# Patient Record
Sex: MALE | Race: WHITE | Employment: UNEMPLOYED | URBAN - METROPOLITAN AREA
[De-identification: names, ages, dates, MRNs, and addresses within clinical notes are randomized per-mention and may not be internally consistent; named-entity substitution may affect disease eponyms.]

---

## 2021-01-10 ENCOUNTER — APPOINTMENT (OUTPATIENT)
Dept: ULTRASOUND IMAGING | Age: 31
End: 2021-01-10

## 2021-01-10 ENCOUNTER — HOSPITAL ENCOUNTER (EMERGENCY)
Age: 31
Discharge: HOME | End: 2021-01-10

## 2021-01-10 VITALS
SYSTOLIC BLOOD PRESSURE: 123 MMHG | WEIGHT: 210 LBS | HEART RATE: 66 BPM | TEMPERATURE: 98.8 F | BODY MASS INDEX: 31.1 KG/M2 | HEIGHT: 69 IN | RESPIRATION RATE: 16 BRPM | OXYGEN SATURATION: 97 % | DIASTOLIC BLOOD PRESSURE: 77 MMHG

## 2021-01-10 DIAGNOSIS — R10.9 ABDOMINAL PAIN, UNSPECIFIED ABDOMINAL LOCATION: Primary | ICD-10-CM

## 2021-01-10 DIAGNOSIS — R11.2 NON-INTRACTABLE VOMITING WITH NAUSEA, UNSPECIFIED VOMITING TYPE: ICD-10-CM

## 2021-01-10 LAB
ALBUMIN SERPL-MCNC: 3.9 GM/DL (ref 3.4–5)
ALP BLD-CCNC: 79 IU/L (ref 40–129)
ALT SERPL-CCNC: 15 U/L (ref 10–40)
ANION GAP SERPL CALCULATED.3IONS-SCNC: 9 MMOL/L (ref 4–16)
AST SERPL-CCNC: 17 IU/L (ref 15–37)
BASOPHILS ABSOLUTE: 0.1 K/CU MM
BASOPHILS RELATIVE PERCENT: 0.7 % (ref 0–1)
BILIRUB SERPL-MCNC: 0.2 MG/DL (ref 0–1)
BUN BLDV-MCNC: 13 MG/DL (ref 6–23)
CALCIUM SERPL-MCNC: 9 MG/DL (ref 8.3–10.6)
CHLORIDE BLD-SCNC: 99 MMOL/L (ref 99–110)
CO2: 25 MMOL/L (ref 21–32)
CREAT SERPL-MCNC: 0.8 MG/DL (ref 0.9–1.3)
DIFFERENTIAL TYPE: ABNORMAL
EOSINOPHILS ABSOLUTE: 0.3 K/CU MM
EOSINOPHILS RELATIVE PERCENT: 3.1 % (ref 0–3)
GFR AFRICAN AMERICAN: >60 ML/MIN/1.73M2
GFR NON-AFRICAN AMERICAN: >60 ML/MIN/1.73M2
GLUCOSE BLD-MCNC: 87 MG/DL (ref 70–99)
HCT VFR BLD CALC: 44.2 % (ref 42–52)
HEMOGLOBIN: 14.7 GM/DL (ref 13.5–18)
IMMATURE NEUTROPHIL %: 0.2 % (ref 0–0.43)
LIPASE: 25 IU/L (ref 13–60)
LYMPHOCYTES ABSOLUTE: 4.3 K/CU MM
LYMPHOCYTES RELATIVE PERCENT: 43.4 % (ref 24–44)
MCH RBC QN AUTO: 29 PG (ref 27–31)
MCHC RBC AUTO-ENTMCNC: 33.3 % (ref 32–36)
MCV RBC AUTO: 87.2 FL (ref 78–100)
MONOCYTES ABSOLUTE: 0.9 K/CU MM
MONOCYTES RELATIVE PERCENT: 9 % (ref 0–4)
NUCLEATED RBC %: 0 %
PDW BLD-RTO: 13.6 % (ref 11.7–14.9)
PLATELET # BLD: 305 K/CU MM (ref 140–440)
PMV BLD AUTO: 9.2 FL (ref 7.5–11.1)
POTASSIUM SERPL-SCNC: 4 MMOL/L (ref 3.5–5.1)
RBC # BLD: 5.07 M/CU MM (ref 4.6–6.2)
SEGMENTED NEUTROPHILS ABSOLUTE COUNT: 4.4 K/CU MM
SEGMENTED NEUTROPHILS RELATIVE PERCENT: 43.6 % (ref 36–66)
SODIUM BLD-SCNC: 133 MMOL/L (ref 135–145)
TOTAL IMMATURE NEUTOROPHIL: 0.02 K/CU MM
TOTAL NUCLEATED RBC: 0 K/CU MM
TOTAL PROTEIN: 6.9 GM/DL (ref 6.4–8.2)
WBC # BLD: 10 K/CU MM (ref 4–10.5)

## 2021-01-10 PROCEDURE — 2580000003 HC RX 258: Performed by: PHYSICIAN ASSISTANT

## 2021-01-10 PROCEDURE — 85025 COMPLETE CBC W/AUTO DIFF WBC: CPT

## 2021-01-10 PROCEDURE — 83690 ASSAY OF LIPASE: CPT

## 2021-01-10 PROCEDURE — 80053 COMPREHEN METABOLIC PANEL: CPT

## 2021-01-10 PROCEDURE — 96361 HYDRATE IV INFUSION ADD-ON: CPT

## 2021-01-10 PROCEDURE — 96374 THER/PROPH/DIAG INJ IV PUSH: CPT

## 2021-01-10 PROCEDURE — 96375 TX/PRO/DX INJ NEW DRUG ADDON: CPT

## 2021-01-10 PROCEDURE — 76705 ECHO EXAM OF ABDOMEN: CPT

## 2021-01-10 PROCEDURE — 6360000002 HC RX W HCPCS: Performed by: PHYSICIAN ASSISTANT

## 2021-01-10 PROCEDURE — 99285 EMERGENCY DEPT VISIT HI MDM: CPT

## 2021-01-10 RX ORDER — KETOROLAC TROMETHAMINE 30 MG/ML
30 INJECTION, SOLUTION INTRAMUSCULAR; INTRAVENOUS ONCE
Status: COMPLETED | OUTPATIENT
Start: 2021-01-10 | End: 2021-01-10

## 2021-01-10 RX ORDER — 0.9 % SODIUM CHLORIDE 0.9 %
1000 INTRAVENOUS SOLUTION INTRAVENOUS ONCE
Status: COMPLETED | OUTPATIENT
Start: 2021-01-10 | End: 2021-01-10

## 2021-01-10 RX ORDER — ONDANSETRON 4 MG/1
4 TABLET, ORALLY DISINTEGRATING ORAL EVERY 6 HOURS
Qty: 10 TABLET | Refills: 0 | Status: SHIPPED | OUTPATIENT
Start: 2021-01-10

## 2021-01-10 RX ORDER — ONDANSETRON 2 MG/ML
4 INJECTION INTRAMUSCULAR; INTRAVENOUS ONCE
Status: COMPLETED | OUTPATIENT
Start: 2021-01-10 | End: 2021-01-10

## 2021-01-10 RX ORDER — FAMOTIDINE 20 MG/1
20 TABLET, FILM COATED ORAL 2 TIMES DAILY
Qty: 20 TABLET | Refills: 0 | Status: SHIPPED | OUTPATIENT
Start: 2021-01-10 | End: 2022-01-10

## 2021-01-10 RX ORDER — DICYCLOMINE HYDROCHLORIDE 10 MG/1
20 CAPSULE ORAL
Qty: 30 CAPSULE | Refills: 0 | Status: SHIPPED | OUTPATIENT
Start: 2021-01-10

## 2021-01-10 RX ADMIN — SODIUM CHLORIDE 1000 ML: 9 INJECTION, SOLUTION INTRAVENOUS at 20:57

## 2021-01-10 RX ADMIN — KETOROLAC TROMETHAMINE 30 MG: 30 INJECTION, SOLUTION INTRAMUSCULAR; INTRAVENOUS at 20:58

## 2021-01-10 RX ADMIN — ONDANSETRON 4 MG: 2 INJECTION INTRAMUSCULAR; INTRAVENOUS at 20:57

## 2021-01-10 ASSESSMENT — PAIN SCALES - GENERAL: PAINLEVEL_OUTOF10: 8

## 2021-01-10 ASSESSMENT — PAIN DESCRIPTION - PAIN TYPE: TYPE: ACUTE PAIN

## 2021-01-11 NOTE — ED PROVIDER NOTES
eMERGENCY dEPARTMENT eNCOUnter      PCP: No primary care provider on file. CHIEF COMPLAINT    Chief Complaint   Patient presents with    Abdominal Pain       HPI    Elizabeth Patel is a 31 y.o. male who presents with abdominal pain. Abdominal pain began yesterday after work and has been persistent since onset with waxing and waning severity pain is more significant in right mid to upper abdomen. He endorses associated nausea and vomiting. He states any food or drink today because his emesis. No associated fevers. No urinary symptoms including dysuria or hematuria. No diarrhea. He denies previous abdominal surgeries. No cough, shortness of breath. REVIEW OF SYSTEMS    Constitutional:  Denies fever, chills, weight loss or weakness   HENT:  Denies sore throat or ear pain   Cardiovascular:  Denies chest pain, palpitations or swelling   Respiratory:  Denies cough or shortness of breath   GI:  See HPI above  : No hematuria or dysuria. Musculoskeletal:  Denies back pain or groin pain or masses. No pain or swelling of extremities.   Skin:  Denies rash  Neurologic:  Denies headache, focal weakness or sensory changes   Endocrine:  Denies polyuria or polydypsia   Lymphatic:  Denies swollen glands     All other review of systems are negative  See HPI and nursing notes for additional information     PAST MEDICAL & SURGICAL HISTORY    Past Medical History:   Diagnosis Date    Hypertension      Past Surgical History:   Procedure Laterality Date    FINGER AMPUTATION      2008       CURRENT MEDICATIONS    Current Outpatient Rx   Medication Sig Dispense Refill    ondansetron (ZOFRAN ODT) 4 MG disintegrating tablet Take 1 tablet by mouth every 6 hours 10 tablet 0    famotidine (PEPCID) 20 MG tablet Take 1 tablet by mouth 2 times daily 20 tablet 0    dicyclomine (BENTYL) 10 MG capsule Take 2 capsules by mouth 4 times daily (before meals and nightly) 30 capsule 0    naproxen (NAPROSYN) 500 MG tablet Take 1 tablet by mouth every 12 hours as needed for Pain 60 tablet 0    lithium 300 MG capsule Take 300 mg by mouth 3 times daily (with meals). Out of medicine -       aripiprazole (ABILIFY) 15 MG tablet Take 15 mg by mouth daily. ALLERGIES    Allergies   Allergen Reactions    Pcn [Penicillins] Itching       SOCIAL AND FAMILY HISTORY    Social History     Socioeconomic History    Marital status: Single     Spouse name: None    Number of children: None    Years of education: None    Highest education level: None   Occupational History    None   Social Needs    Financial resource strain: None    Food insecurity     Worry: None     Inability: None    Transportation needs     Medical: None     Non-medical: None   Tobacco Use    Smoking status: Current Every Day Smoker     Packs/day: 0.50     Years: 5.00     Pack years: 2.50     Types: Cigarettes    Smokeless tobacco: Never Used   Substance and Sexual Activity    Alcohol use: Yes     Comment: social    Drug use: No    Sexual activity: Yes     Partners: Female   Lifestyle    Physical activity     Days per week: None     Minutes per session: None    Stress: None   Relationships    Social connections     Talks on phone: None     Gets together: None     Attends Taoism service: None     Active member of club or organization: None     Attends meetings of clubs or organizations: None     Relationship status: None    Intimate partner violence     Fear of current or ex partner: None     Emotionally abused: None     Physically abused: None     Forced sexual activity: None   Other Topics Concern    None   Social History Narrative    None     History reviewed. No pertinent family history. PHYSICAL EXAM    VITAL SIGNS: /77   Pulse 66   Temp 98.8 °F (37.1 °C) (Oral)   Resp 16   Ht 5' 9\" (1.753 m)   Wt 210 lb (95.3 kg)   SpO2 97%   BMI 31.01 kg/m²   Constitutional:  Well developed, well nourished.   No distress  Eyes:  Sclera nonicteric, 1.3 MG/DL    Glucose 87 70 - 99 MG/DL    Calcium 9.0 8.3 - 10.6 MG/DL    Alb 3.9 3.4 - 5.0 GM/DL    Total Protein 6.9 6.4 - 8.2 GM/DL    Total Bilirubin 0.2 0.0 - 1.0 MG/DL    ALT 15 10 - 40 U/L    AST 17 15 - 37 IU/L    Alkaline Phosphatase 79 40 - 129 IU/L    GFR Non-African American >60 >60 mL/min/1.73m2    GFR African American >60 >60 mL/min/1.73m2    Anion Gap 9 4 - 16   Lipase   Result Value Ref Range    Lipase 25 13 - 60 IU/L           RADIOLOGY/PROCEDURES    US ABDOMEN LIMITED   Final Result   Contracted gallbladder without sonographic evidence of cholelithiasis or   acute cholecystitis seen. ED COURSE & MEDICAL DECISION MAKING       Vital signs and nursing notes reviewed during ED course. I have independently evaluated this patient . Supervising MD present in the Emergency Department, available for consultation, throughout entirety of  patient care. Patient presents as above with 1 day of abdominal pain, nausea, vomiting. He is hemodynamically stable on arrival, afebrile, tachycardic. Abdomen is soft. Discomfort to palpation right upper quadrant. He is given some fluids, Toradol, and Zofran. Lab work without emergent process. Right upper quadrant ultrasound demonstrates a contracted gallbladder without evidence of cholelithiasis or acute cholecystitis. On subsequent evaluation, patient is asleep in exam bed, easily arousable. He reports resolution of pain, has had no episodes of emesis here in the ED and is tolerating oral fluids. Repeat abdominal exam is nontender. I discussed with patient pending urinalysis to rule out infection or evidence of red blood cells, however patient declines, requesting to be discharged with a work note. I did discuss with patient that he should immediately return with any new or worsening symptoms.   We discussed signs symptoms consistent with appendicitis discussed that this was not evaluated on ultrasound imaging today and this could be an early presentation of this or other emergent intra-abdominal process. Patient understanding of this and agreeable with close outpatient follow-up. Diagnosis, disposition, and treatment plan reviewed with patient and/or family who understands and agrees. Patient understands and agrees to follow up with PCP in 1 day. Patient understands and agrees to return to the emergency department for any new or worsening symptoms. Clinical  IMPRESSION    1. Abdominal pain, unspecified abdominal location    2. Non-intractable vomiting with nausea, unspecified vomiting type        Disposition referral (if applicable):  [address]  [address] [facility]  [phone]  Schedule an appointment as soon as possible for a visit in 1 day  For recheck of symptoms treated for today    [facility] Emergency Department  [place] [id number] [phone]  Go to   As needed, If symptoms worsen      Disposition medications (if applicable):  Discharge Medication List as of 1/10/2021 11:36 PM      START taking these medications    Details   ondansetron (ZOFRAN ODT) 4 MG disintegrating tablet Take 1 tablet by mouth every 6 hours, Disp-10 tablet, R-0Print      famotidine (PEPCID) 20 MG tablet Take 1 tablet by mouth 2 times daily, Disp-20 tablet, R-0Print      dicyclomine (BENTYL) 10 MG capsule Take 2 capsules by mouth 4 times daily (before meals and nightly), Disp-30 capsule, R-0Print               Comment: Please note this report has been produced using speech recognition software and may contain errors related to that system including errors in grammar, punctuation, and spelling, as well as words and phrases that may be inappropriate. If there are any questions or concerns please feel free to contact the dictating provider for clarification.         FRENCH Mcmahan  01/11/21 0028

## 2021-01-11 NOTE — ED NOTES
Report given to [facility]; care transferred at this time.       Lindy Kebede, JONEL  01/10/21 041

## 2022-01-10 ENCOUNTER — HOSPITAL ENCOUNTER (EMERGENCY)
Age: 32
Discharge: HOME | End: 2022-01-10
Attending: STUDENT IN AN ORGANIZED HEALTH CARE EDUCATION/TRAINING PROGRAM
Payer: COMMERCIAL

## 2022-01-10 VITALS
BODY MASS INDEX: 27.72 KG/M2 | TEMPERATURE: 98 F | OXYGEN SATURATION: 100 % | DIASTOLIC BLOOD PRESSURE: 84 MMHG | HEART RATE: 84 BPM | SYSTOLIC BLOOD PRESSURE: 122 MMHG | WEIGHT: 187.13 LBS | RESPIRATION RATE: 16 BRPM | HEIGHT: 69 IN

## 2022-01-10 DIAGNOSIS — R10.13 ABDOMINAL PAIN, EPIGASTRIC: Primary | ICD-10-CM

## 2022-01-10 DIAGNOSIS — R74.8 ELEVATED LIPASE: ICD-10-CM

## 2022-01-10 LAB
ALBUMIN SERPL-MCNC: 4.8 GM/DL (ref 3.4–5)
ALP BLD-CCNC: 103 IU/L (ref 40–128)
ALT SERPL-CCNC: 35 U/L (ref 10–40)
AMPHETAMINES: NEGATIVE
ANION GAP SERPL CALCULATED.3IONS-SCNC: 9 MMOL/L (ref 4–16)
AST SERPL-CCNC: 35 IU/L (ref 15–37)
BACTERIA: NEGATIVE /HPF
BARBITURATE SCREEN URINE: NEGATIVE
BASOPHILS ABSOLUTE: 0 K/CU MM
BASOPHILS RELATIVE PERCENT: 0.3 % (ref 0–1)
BENZODIAZEPINE SCREEN, URINE: NEGATIVE
BILIRUB SERPL-MCNC: 0.6 MG/DL (ref 0–1)
BILIRUBIN URINE: NEGATIVE MG/DL
BLOOD, URINE: NEGATIVE
BUN BLDV-MCNC: 8 MG/DL (ref 6–23)
CALCIUM SERPL-MCNC: 9 MG/DL (ref 8.3–10.6)
CANNABINOID SCREEN URINE: ABNORMAL
CHLORIDE BLD-SCNC: 102 MMOL/L (ref 99–110)
CLARITY: CLEAR
CO2: 28 MMOL/L (ref 21–32)
COCAINE METABOLITE: NEGATIVE
COLOR: YELLOW
CREAT SERPL-MCNC: 0.7 MG/DL (ref 0.9–1.3)
DIFFERENTIAL TYPE: ABNORMAL
EOSINOPHILS ABSOLUTE: 0.1 K/CU MM
EOSINOPHILS RELATIVE PERCENT: 0.7 % (ref 0–3)
GFR AFRICAN AMERICAN: >60 ML/MIN/1.73M2
GFR NON-AFRICAN AMERICAN: >60 ML/MIN/1.73M2
GLUCOSE BLD-MCNC: 87 MG/DL (ref 70–99)
GLUCOSE, URINE: NEGATIVE MG/DL
HCT VFR BLD CALC: 51.2 % (ref 42–52)
HEMOGLOBIN: 17 GM/DL (ref 13.5–18)
IMMATURE NEUTROPHIL %: 0.1 % (ref 0–0.43)
KETONES, URINE: NEGATIVE MG/DL
LEUKOCYTE ESTERASE, URINE: NEGATIVE
LIPASE: 119 IU/L (ref 13–60)
LYMPHOCYTES ABSOLUTE: 3 K/CU MM
LYMPHOCYTES RELATIVE PERCENT: 43.4 % (ref 24–44)
MCH RBC QN AUTO: 28.8 PG (ref 27–31)
MCHC RBC AUTO-ENTMCNC: 33.2 % (ref 32–36)
MCV RBC AUTO: 86.8 FL (ref 78–100)
MONOCYTES ABSOLUTE: 0.6 K/CU MM
MONOCYTES RELATIVE PERCENT: 8.8 % (ref 0–4)
MUCUS: ABNORMAL HPF
NITRITE URINE, QUANTITATIVE: NEGATIVE
NUCLEATED RBC %: 0 %
OPIATES, URINE: NEGATIVE
OXYCODONE: NEGATIVE
PDW BLD-RTO: 13.4 % (ref 11.7–14.9)
PH, URINE: 6 (ref 5–8)
PHENCYCLIDINE, URINE: NEGATIVE
PLATELET # BLD: 220 K/CU MM (ref 140–440)
PMV BLD AUTO: 10.1 FL (ref 7.5–11.1)
POTASSIUM SERPL-SCNC: 4.4 MMOL/L (ref 3.5–5.1)
PROTEIN UA: ABNORMAL MG/DL
RBC # BLD: 5.9 M/CU MM (ref 4.6–6.2)
RBC URINE: <1 /HPF (ref 0–3)
SEGMENTED NEUTROPHILS ABSOLUTE COUNT: 3.2 K/CU MM
SEGMENTED NEUTROPHILS RELATIVE PERCENT: 46.7 % (ref 36–66)
SODIUM BLD-SCNC: 139 MMOL/L (ref 135–145)
SPECIFIC GRAVITY UA: 1.03 (ref 1–1.03)
SQUAMOUS EPITHELIAL: <1 /HPF
TOTAL IMMATURE NEUTOROPHIL: 0.01 K/CU MM
TOTAL NUCLEATED RBC: 0 K/CU MM
TOTAL PROTEIN: 7.8 GM/DL (ref 6.4–8.2)
UROBILINOGEN, URINE: NORMAL MG/DL (ref 0.2–1)
WBC # BLD: 6.8 K/CU MM (ref 4–10.5)
WBC UA: 1 /HPF (ref 0–2)

## 2022-01-10 PROCEDURE — 80053 COMPREHEN METABOLIC PANEL: CPT

## 2022-01-10 PROCEDURE — 83690 ASSAY OF LIPASE: CPT

## 2022-01-10 PROCEDURE — 85025 COMPLETE CBC W/AUTO DIFF WBC: CPT

## 2022-01-10 PROCEDURE — 99285 EMERGENCY DEPT VISIT HI MDM: CPT

## 2022-01-10 PROCEDURE — 80307 DRUG TEST PRSMV CHEM ANLYZR: CPT

## 2022-01-10 PROCEDURE — 81001 URINALYSIS AUTO W/SCOPE: CPT

## 2022-01-10 PROCEDURE — 6370000000 HC RX 637 (ALT 250 FOR IP): Performed by: PHYSICIAN ASSISTANT

## 2022-01-10 RX ORDER — MAGNESIUM HYDROXIDE/ALUMINUM HYDROXICE/SIMETHICONE 120; 1200; 1200 MG/30ML; MG/30ML; MG/30ML
30 SUSPENSION ORAL ONCE
Status: COMPLETED | OUTPATIENT
Start: 2022-01-10 | End: 2022-01-10

## 2022-01-10 RX ORDER — LIDOCAINE HYDROCHLORIDE 20 MG/ML
15 SOLUTION OROPHARYNGEAL ONCE
Status: COMPLETED | OUTPATIENT
Start: 2022-01-10 | End: 2022-01-10

## 2022-01-10 RX ORDER — OMEPRAZOLE 20 MG/1
20 CAPSULE, DELAYED RELEASE ORAL
Qty: 30 CAPSULE | Refills: 0 | Status: SHIPPED | OUTPATIENT
Start: 2022-01-10 | End: 2022-02-01 | Stop reason: DRUGHIGH

## 2022-01-10 RX ORDER — FAMOTIDINE 20 MG/1
20 TABLET, FILM COATED ORAL ONCE
Status: COMPLETED | OUTPATIENT
Start: 2022-01-10 | End: 2022-01-10

## 2022-01-10 RX ADMIN — Medication 15 ML: at 21:53

## 2022-01-10 RX ADMIN — FAMOTIDINE 20 MG: 20 TABLET, FILM COATED ORAL at 21:52

## 2022-01-10 RX ADMIN — ALUMINUM HYDROXIDE, MAGNESIUM HYDROXIDE, AND SIMETHICONE 30 ML: 200; 200; 20 SUSPENSION ORAL at 21:53

## 2022-01-10 ASSESSMENT — PAIN DESCRIPTION - DESCRIPTORS: DESCRIPTORS: SHARP;STABBING

## 2022-01-10 ASSESSMENT — PAIN DESCRIPTION - LOCATION: LOCATION: ABDOMEN

## 2022-01-10 ASSESSMENT — PAIN DESCRIPTION - PAIN TYPE: TYPE: ACUTE PAIN

## 2022-01-10 ASSESSMENT — PAIN DESCRIPTION - FREQUENCY: FREQUENCY: CONTINUOUS

## 2022-01-10 ASSESSMENT — PAIN SCALES - GENERAL: PAINLEVEL_OUTOF10: 8

## 2022-01-10 NOTE — ED PROVIDER NOTES
As provider-in-triage, I performed a medical screening history and physical exam on this patient. HISTORY OF PRESENT ILLNESS  Hetal Aceves is a 36 y.o. male patient to the emergency department today after several episodes of vomiting, epigastrium discomfort and \"bloody chunks in his vomit\". Patient states that he has normal state of health, states that he started having some epigastric discomfort after waking from a nap. He states he had an episode of vomiting and was puking up small clots of blood. He states he is not having any active pain. No history of peptic ulcer but states that he \"they told me I may have had one\". No blood thinning medication. No significant NSAID use. Denies alcohol abuse. No dark-colored stools. No other significant medical issues. PHYSICAL EXAM  There were no vitals taken for this visit. On exam, the patient appears in no acute distress. Speech is clear. Breathing is unlabored. Moves all extremities    Comment: Please note this report has been produced using speech recognition software and may contain errors related to that system including errors in grammar, punctuation, and spelling, as well as words and phrases that may be inappropriate. If there are any questions or concerns please feel free to contact the dictating provider for clarification.         Alba Youngblood 411, PA  01/10/22 2929

## 2022-01-11 NOTE — ED PROVIDER NOTES
Emergency Department Encounter    Patient: Dasha He  MRN: [id number]  : 1990  Date of Evaluation: 1/10/2022  ED Provider:  Ayde Smallwood MD    Triage Chief Complaint:   Abdominal Pain (vomiitng blood, abd pain)    [place]:  Dasha He is a 36 y.o. male with history seen below presenting with epigastric right upper quadrant pain for the past month. Patient states for the past month he has had intermittent episodes of epigastric right upper quadrant pain mostly after eating. States he has had some intermittent nonbilious nonbloody vomiting. States this morning he did have 1 episode where he did have bloody streaks within his vomit that were bright red. Patient states on presentation that his pain is 8 out of 10, constant, stabbing, no radiation worse with palpation without relieving factors. States that after the GI cocktail given to him in the ED that his symptoms have improved. Patient denies any significant pain currently. Denies any alcohol use, history of pancreatitis. Denies abdominal surgeries in the past.  Denies any vomiting since episode this morning. Denies any changes in bowel habits, blood or melena stools. Denies urinary symptoms including dysuria, increased frequency, urgency, hematuria. Denies chest pain or shortness of breath, cough or sputum production. Denies headache, blurred vision, focal neurodeficits, motor or sensory changes. Denies alcohol or drug use. ROS - see HPI, below listed is current ROS at time of my eval:    Past Medical History:   Diagnosis Date    Hypertension      Past Surgical History:   Procedure Laterality Date    FINGER AMPUTATION           History reviewed. No pertinent family history.   Social History     Socioeconomic History    Marital status: Single     Spouse name: Not on file    Number of children: Not on file    Years of education: Not on file    Highest education level: Not on file   Occupational History    Not on file   Tobacco Use    Smoking status: Current Every Day Smoker     Packs/day: 0.50     Years: 5.00     Pack years: 2.50     Types: Cigarettes    Smokeless tobacco: Never Used   Substance and Sexual Activity    Alcohol use: Yes     Comment: social    Drug use: No    Sexual activity: Yes     Partners: Female   Other Topics Concern    Not on file   Social History Narrative    Not on file     Social Determinants of Health     Financial Resource Strain:     Difficulty of Paying Living Expenses: Not on file   Food Insecurity:     Worried About Running Out of Food in the Last Year: Not on file    Betty of Food in the Last Year: Not on file   Transportation Needs:     Lack of Transportation (Medical): Not on file    Lack of Transportation (Non-Medical): Not on file   Physical Activity:     Days of Exercise per Week: Not on file    Minutes of Exercise per Session: Not on file   Stress:     Feeling of Stress : Not on file   Social Connections:     Frequency of Communication with Friends and Family: Not on file    Frequency of Social Gatherings with Friends and Family: Not on file    Attends Evangelical Services: Not on file    Active Member of [address] [place] [address] or Organizations: Not on file    Attends Club or Organization Meetings: Not on file    Marital Status: Not on file   Intimate Partner Violence:     Fear of Current or Ex-Partner: Not on file    Emotionally Abused: Not on file    Physically Abused: Not on file    Sexually Abused: Not on file   Housing Stability:     Unable to Pay for Housing in the Last Year: Not on file    Number of [place] in the Last Year: Not on file    Unstable Housing in the Last Year: Not on file     No current facility-administered medications for this encounter.      Current Outpatient Medications   Medication Sig Dispense Refill    ondansetron (ZOFRAN ODT) 4 MG disintegrating tablet Take 1 tablet by mouth every 6 hours 10 tablet 0    famotidine (PEPCID) 20 MG tablet Take 1 tablet by mouth 2 times daily 20 tablet 0    dicyclomine (BENTYL) 10 MG capsule Take 2 capsules by mouth 4 times daily (before meals and nightly) 30 capsule 0    naproxen (NAPROSYN) 500 MG tablet Take 1 tablet by mouth every 12 hours as needed for Pain 60 tablet 0    lithium 300 MG capsule Take 300 mg by mouth 3 times daily (with meals). Out of medicine -       aripiprazole (ABILIFY) 15 MG tablet Take 15 mg by mouth daily. Allergies   Allergen Reactions    Pcn [Penicillins] Itching       Nursing Notes Reviewed    Physical Exam:  Triage VS:    ED Triage Vitals [01/10/22 1741]   Enc Vitals Group      BP (!) 129/96      Pulse 98      Resp 18      Temp 98 °F (36.7 °C)      Temp Source Oral      SpO2 99 %      Weight 187 lb 2 oz (84.9 kg)      Height 5' 9\" (1.753 m)      Head Circumference       Peak Flow       Pain Score       Pain Loc       Pain Edu? Excl. in [address]? My pulse ox interpretation is - normal    General appearance:  No acute distress. Skin:  Warm. Dry. Eye:  Extraocular movements intact. Ears, nose, mouth and throat:  Oral mucosa moist   Neck:  Trachea midline. Extremity:  No swelling. Normal ROM     Heart:  Regular rate and rhythm, normal S1 & S2, no extra heart sounds. Perfusion:  intact  Respiratory:  Lungs clear to auscultation bilaterally. Respirations nonlabored. Abdominal:  Normal bowel sounds. Soft. Discomfort in the epigastric region and right upper quadrant without any rebound or guarding. Back:  No CVA tenderness to palpation     Neurological:  Alert and oriented times 3. No focal neuro deficits.              Psychiatric:  Appropriate    I have reviewed and interpreted all of the currently available lab results from this visit (if applicable):  Results for orders placed or performed during the hospital encounter of 01/10/22   CBC auto diff   Result Value Ref Range    WBC 6.8 4.0 - 10.5 K/CU MM    RBC 5.90 4.6 - 6.2 M/CU MM    Hemoglobin 17.0 13.5 - 18.0 GM/DL Hematocrit 51.2 42 - 52 %    MCV 86.8 78 - 100 FL    MCH 28.8 27 - 31 PG    MCHC 33.2 32.0 - 36.0 %    RDW 13.4 11.7 - 14.9 %    Platelets 865 278 - 209 K/CU MM    MPV 10.1 7.5 - 11.1 FL    Differential Type AUTOMATED DIFFERENTIAL     Segs Relative 46.7 36 - 66 %    Lymphocytes % 43.4 24 - 44 %    Monocytes % 8.8 (H) 0 - 4 %    Eosinophils % 0.7 0 - 3 %    Basophils % 0.3 0 - 1 %    Segs Absolute 3.2 K/CU MM    Lymphocytes Absolute 3.0 K/CU MM    Monocytes Absolute 0.6 K/CU MM    Eosinophils Absolute 0.1 K/CU MM    Basophils Absolute 0.0 K/CU MM    Nucleated RBC % 0.0 %    Total Nucleated RBC 0.0 K/CU MM    Total Immature Neutrophil 0.01 K/CU MM    Immature Neutrophil % 0.1 0 - 0.43 %   CMP   Result Value Ref Range    Sodium 139 135 - 145 MMOL/L    Potassium 4.4 3.5 - 5.1 MMOL/L    Chloride 102 99 - 110 mMol/L    CO2 28 21 - 32 MMOL/L    BUN 8 6 - 23 MG/DL    CREATININE 0.7 (L) 0.9 - 1.3 MG/DL    Glucose 87 70 - 99 MG/DL    Calcium 9.0 8.3 - 10.6 MG/DL    Albumin 4.8 3.4 - 5.0 GM/DL    Total Protein 7.8 6.4 - 8.2 GM/DL    Total Bilirubin 0.6 0.0 - 1.0 MG/DL    ALT 35 10 - 40 U/L    AST 35 15 - 37 IU/L    Alkaline Phosphatase 103 40 - 128 IU/L    GFR Non-African American >60 >60 mL/min/1.73m2    GFR African American >60 >60 mL/min/1.73m2    Anion Gap 9 4 - 16   Lipase   Result Value Ref Range    Lipase 119 (H) 13 - 60 IU/L   Urinalysis (Lab)   Result Value Ref Range    Color, UA YELLOW YELLOW    Clarity, UA CLEAR CLEAR    Glucose, Urine NEGATIVE NEGATIVE MG/DL    Bilirubin Urine NEGATIVE NEGATIVE MG/DL    Ketones, Urine NEGATIVE NEGATIVE MG/DL    Specific Gravity, UA 1.026 1.001 - 1.035    Blood, Urine NEGATIVE NEGATIVE    pH, Urine 6.0 5.0 - 8.0    Protein, UA TRACE (A) NEGATIVE MG/DL    Urobilinogen, Urine NORMAL 0.2 - 1.0 MG/DL    Nitrite Urine, Quantitative NEGATIVE NEGATIVE    Leukocyte Esterase, Urine NEGATIVE NEGATIVE    RBC, UA <1 0 - 3 /HPF    WBC, UA 1 0 - 2 /HPF    Bacteria, UA NEGATIVE NEGATIVE /HPF Squam Epithel, UA <1 /HPF    Mucus, UA MANY (A) NEGATIVE HPF   Urine Drug Screen   Result Value Ref Range    Cannabinoid Scrn, Ur UNCONFIRMED POSITIVE (A) NEGATIVE    Amphetamines NEGATIVE NEGATIVE    Cocaine Metabolite NEGATIVE NEGATIVE    Benzodiazepine Screen, Urine NEGATIVE NEGATIVE    Barbiturate Screen, Ur NEGATIVE NEGATIVE    Opiates, Urine NEGATIVE NEGATIVE    Phencyclidine, Urine NEGATIVE NEGATIVE    Oxycodone NEGATIVE NEGATIVE      Radiographs (if obtained):  Radiologist's Report Reviewed:  US ABDOMEN LIMITED    Result Date: 1/10/2021  EXAMINATION: RIGHT UPPER QUADRANT ULTRASOUND 1/10/2021 9:19 pm COMPARISON: None. HISTORY: ORDERING SYSTEM PROVIDED HISTORY: Right upper quadrant ultrasound TECHNOLOGIST PROVIDED HISTORY: Reason for exam:->Right upper quadrant ultrasound Reason for Exam: Abd pain, nausea, vomiting Acuity: Acute Type of Exam: Initial FINDINGS: LIVER:  The liver demonstrates normal echogenicity without evidence of intrahepatic biliary ductal dilatation. BILIARY SYSTEM:  The gallbladder appears contracted. No significant pericholecystic fluid or sonographic Martell Lust sign was elicited on the examination. Common bile duct is within normal limits measuring 3 mm. RIGHT KIDNEY: The right kidney is grossly unremarkable without evidence of hydronephrosis. PANCREAS:  Evaluation of pancreas is limited due to overlying bowel gas. OTHER: No evidence of right upper quadrant ascites. Contracted gallbladder without sonographic evidence of cholelithiasis or acute cholecystitis seen. MDM:    27-year-old male presenting with epigastric right upper quadrant pain. History and be seen above. Vitals on presentation are reassuring and patient afebrile satting well in room air. On exam patient has mild discomfort in the epigastric right upper quadrant region without any rebound or guarding. CBC, CMP are reassuring. Lipase is mildly elevated at 119.   Patient denies any alcohol or drug use or history

## 2022-01-11 NOTE — ED NOTES
Dr. Rosamaria Drake states he is consulting GI and then will d/c Pt does not want CT or US, he does not need an IV.      Maricruz Tee RN  01/10/22 6212

## 2022-01-11 NOTE — ED NOTES
Bed: ED-02  Expected date:   Expected time:   Means of arrival:   Comments:  Triage TR     Eric Isidro RN  01/10/22 2355

## 2022-01-23 ENCOUNTER — HOSPITAL ENCOUNTER (EMERGENCY)
Age: 32
Discharge: HOME | End: 2022-01-24
Payer: COMMERCIAL

## 2022-01-23 VITALS
HEART RATE: 89 BPM | TEMPERATURE: 98.2 F | RESPIRATION RATE: 16 BRPM | DIASTOLIC BLOOD PRESSURE: 84 MMHG | SYSTOLIC BLOOD PRESSURE: 121 MMHG | OXYGEN SATURATION: 99 %

## 2022-01-23 DIAGNOSIS — L02.512 ABSCESS OF LEFT HAND: Primary | ICD-10-CM

## 2022-01-23 PROCEDURE — 10061 I&D ABSCESS COMP/MULTIPLE: CPT

## 2022-01-23 PROCEDURE — 6370000000 HC RX 637 (ALT 250 FOR IP): Performed by: PHYSICIAN ASSISTANT

## 2022-01-23 PROCEDURE — 99283 EMERGENCY DEPT VISIT LOW MDM: CPT

## 2022-01-23 PROCEDURE — 2500000003 HC RX 250 WO HCPCS: Performed by: PHYSICIAN ASSISTANT

## 2022-01-23 RX ORDER — HYDROCODONE BITARTRATE AND ACETAMINOPHEN 5; 325 MG/1; MG/1
1 TABLET ORAL ONCE
Status: COMPLETED | OUTPATIENT
Start: 2022-01-23 | End: 2022-01-23

## 2022-01-23 RX ORDER — SULFAMETHOXAZOLE AND TRIMETHOPRIM 800; 160 MG/1; MG/1
1 TABLET ORAL ONCE
Status: COMPLETED | OUTPATIENT
Start: 2022-01-23 | End: 2022-01-23

## 2022-01-23 RX ORDER — SULFAMETHOXAZOLE AND TRIMETHOPRIM 800; 160 MG/1; MG/1
1 TABLET ORAL 2 TIMES DAILY
Qty: 14 TABLET | Refills: 0 | Status: SHIPPED | OUTPATIENT
Start: 2022-01-23 | End: 2022-01-30

## 2022-01-23 RX ORDER — BUPIVACAINE HYDROCHLORIDE 5 MG/ML
30 INJECTION, SOLUTION EPIDURAL; INTRACAUDAL ONCE
Status: COMPLETED | OUTPATIENT
Start: 2022-01-23 | End: 2022-01-23

## 2022-01-23 RX ORDER — CEPHALEXIN 250 MG/1
500 CAPSULE ORAL ONCE
Status: COMPLETED | OUTPATIENT
Start: 2022-01-23 | End: 2022-01-23

## 2022-01-23 RX ORDER — HYDROCODONE BITARTRATE AND ACETAMINOPHEN 5; 325 MG/1; MG/1
1 TABLET ORAL EVERY 6 HOURS PRN
Qty: 15 TABLET | Refills: 0 | Status: SHIPPED | OUTPATIENT
Start: 2022-01-23 | End: 2022-01-30

## 2022-01-23 RX ORDER — CEPHALEXIN 500 MG/1
500 CAPSULE ORAL 2 TIMES DAILY
Qty: 14 CAPSULE | Refills: 0 | Status: SHIPPED | OUTPATIENT
Start: 2022-01-23 | End: 2022-01-24 | Stop reason: SDUPTHER

## 2022-01-23 RX ADMIN — SULFAMETHOXAZOLE AND TRIMETHOPRIM 1 TABLET: 800; 160 TABLET ORAL at 23:35

## 2022-01-23 RX ADMIN — BUPIVACAINE HYDROCHLORIDE 150 MG: 5 INJECTION, SOLUTION EPIDURAL; INTRACAUDAL at 23:36

## 2022-01-23 RX ADMIN — CEPHALEXIN 500 MG: 250 CAPSULE ORAL at 23:35

## 2022-01-23 RX ADMIN — HYDROCODONE BITARTRATE AND ACETAMINOPHEN 1 TABLET: 5; 325 TABLET ORAL at 23:35

## 2022-01-23 ASSESSMENT — PAIN SCALES - GENERAL
PAINLEVEL_OUTOF10: 7
PAINLEVEL_OUTOF10: 10

## 2022-01-24 PROCEDURE — 87186 SC STD MICRODIL/AGAR DIL: CPT

## 2022-01-24 PROCEDURE — 87070 CULTURE OTHR SPECIMN AEROBIC: CPT

## 2022-01-24 PROCEDURE — 87075 CULTR BACTERIA EXCEPT BLOOD: CPT

## 2022-01-24 PROCEDURE — 87077 CULTURE AEROBIC IDENTIFY: CPT

## 2022-01-24 RX ORDER — CEPHALEXIN 500 MG/1
500 CAPSULE ORAL 2 TIMES DAILY
Qty: 14 CAPSULE | Refills: 0 | Status: SHIPPED | OUTPATIENT
Start: 2022-01-24 | End: 2022-01-31

## 2022-01-24 NOTE — ED PROVIDER NOTES
EMERGENCY DEPARTMENT ENCOUNTER      PCP: No primary care provider on file. CHIEF COMPLAINT    Chief Complaint   Patient presents with    Hand Pain    Abscess     woke up with on left hand middle knuckle; states that when he squeezes it pain shoots up to his arm and and shoulder     This patient was not evaluated by the attending physician. I have independently evaluated this patient . HPI    Toña Stoner is a 36 y.o. male who presents emergency department today with a fluctuant abscess over the third metacarpal phalangeal joint area. He states that \"started as a pimple\" he has been squeezing it and it has become larger in size. He is now having more pain and swelling in this area. He is able to extend and flex at this joint, no red streaking is passing the wrist joint. He does feel like it is generally swollen. He denies being a diabetic, denies IV drug use. Not on antibiotic, denies any other significant risk factors.     REVIEW OF SYSTEMS    Constitutional:  Denies fever, chills  HENT:  Denies sore throat or ear pain   Respiratory:  Denies cough or shortness of breath   Cardiovascular:  Denies chest pain, palpitations or swelling   GI:  Denies abdominal pain, nausea, vomiting, or diarrhea   Musculoskeletal:  Denies back pain   Skin:  See HPI  Neurologic:  Denies headache, focal weakness or sensory changes   Endocrine:  Denies polyuria or polydypsia   Lymphatic:  Denies swollen glands   All other review of systems are negative  See HPI and nursing notes for additional information     PAST MEDICAL HISTORY    Past Medical History:   Diagnosis Date    Hypertension        SURGICAL HISTORY    Past Surgical History:   Procedure Laterality Date    FINGER AMPUTATION      2008       CURRENT MEDICATIONS    Current Outpatient Rx   Medication Sig Dispense Refill    omeprazole (PRILOSEC) 20 MG delayed release capsule Take 1 capsule by mouth every morning (before breakfast) 30 capsule 0    ondansetron (Lonzie Fears ODT) 4 MG disintegrating tablet Take 1 tablet by mouth every 6 hours 10 tablet 0    dicyclomine (BENTYL) 10 MG capsule Take 2 capsules by mouth 4 times daily (before meals and nightly) 30 capsule 0    naproxen (NAPROSYN) 500 MG tablet Take 1 tablet by mouth every 12 hours as needed for Pain 60 tablet 0    lithium 300 MG capsule Take 300 mg by mouth 3 times daily (with meals). Out of medicine -       aripiprazole (ABILIFY) 15 MG tablet Take 15 mg by mouth daily. ALLERGIES    Allergies   Allergen Reactions    Pcn [Penicillins] Itching       FAMILY HISTORY    History reviewed. No pertinent family history. SOCIAL HISTORY    Social History     Socioeconomic History    Marital status: Single     Spouse name: None    Number of children: None    Years of education: None    Highest education level: None   Occupational History    None   Tobacco Use    Smoking status: Current Every Day Smoker     Packs/day: 0.50     Years: 5.00     Pack years: 2.50     Types: Cigarettes    Smokeless tobacco: Never Used   Substance and Sexual Activity    Alcohol use: Yes     Comment: social    Drug use: No    Sexual activity: Yes     Partners: Female   Other Topics Concern    None   Social History Narrative    None     Social Determinants of Health     Financial Resource Strain:     Difficulty of Paying Living Expenses: Not on file   Food Insecurity:     Worried About Running Out of Food in the Last Year: Not on file    Betty of Food in the Last Year: Not on file   Transportation Needs:     Lack of Transportation (Medical): Not on file    Lack of Transportation (Non-Medical):  Not on file   Physical Activity:     Days of Exercise per Week: Not on file    Minutes of Exercise per Session: Not on file   Stress:     Feeling of Stress : Not on file   Social Connections:     Frequency of Communication with Friends and Family: Not on file    Frequency of Social Gatherings with Friends and Family: Not on file  Attends Yazdanism Services: Not on file    Active Member of Clubs or Organizations: Not on file    Attends Club or Organization Meetings: Not on file    Marital Status: Not on file   Intimate Partner Violence:     Fear of Current or Ex-Partner: Not on file    Emotionally Abused: Not on file    Physically Abused: Not on file    Sexually Abused: Not on file   Housing Stability:     Unable to Pay for Housing in the Last Year: Not on file    Number of [place] in the Last Year: Not on file    Unstable Housing in the Last Year: Not on file       PHYSICAL EXAM    VITAL SIGNS: /84   Pulse 89   Temp 98.2 °F (36.8 °C) (Oral)   Resp 16   SpO2 99%   Constitutional:  Well developed, well nourished, no acute distress, non-toxic appearance   HENT:  Atraumatic, Normocephalic. Respiratory:  No respiratory distress, normal breath sounds   Cardiovascular:  Normal rate, normal rhythm, peripheral pulses equal, no edema  GI:  Soft, nondistended, nontender  Musculoskeletal:  No edema, no tenderness, no deformities. No obvious joint effusion, able to extend and flex at the MCP joint. Integument: On inspection there is a 2 cm x 2 cm erythematous circular area of fluctuance over top of the third metacarpophalangeal joint area, there is surrounding erythema, no red streaking, no lymphangitis, does not past the wrist joint. Lymphatics: No lymphangitis. Neurological: alert and oriented, no focal deficits      ________________________________________________________________________    PROCEDURES: Incision and Drainage Procedure Note    Indication: Cutaneous Abscess    Procedure:    - Procedure explained, including risks and benefits explained to the patient who expressed understanding. All questions were answered. Verbal consent obtained.    - The patient was positioned appropriately and Area was prepped and draped in the usual sterile fashion using Betadine.     - The affected area was anesthetized using 0.5% Marcaine without epinephrine, approximately 5-6 mL. - Area of greatest induration was incised utilizing a #11 blade - incision length was approximately 2 cm. - Wound culture was obtained and sent to lab. -Mild/moderate amount of pus mixed with blood was expressed. Loculations were broken up using a blunt probe technique. Cavity was irrigated with sterile saline.   - Wound cavity was packed with 1/4\" iodoform gauze   - Wound was dressed with sterile nonstick dressing. The patient tolerated the procedure well without complication. Post procedure exam of the affected region reveals distal sensation, motor, capillary refill, and pulses intact  ________________________________________________________________________      ED COURSE & MEDICAL DECISION MAKING        While in the emergency room patient underwent I&D procedure please see procedure note above. Wound care instructions were discussed in detail with patient at time of discharge including packing removal, wound care, antibiotics, skin care, and the importance of close follow up for wound check. Patient was provided a prescription for pain medication and Bactrim/Keflex patient agrees to have wound check in 48-72 hours. Was given orthopedic hand surgery to follow-up, was advised of the importance for follow-up. He was advised to return back in 2 or 3 days if symptoms exacerbate or worsen if he was unable to get into the hand surgeon. I discussed the recommendation of application of tea tree oil, especially in the early phase-patient can also use in bath during soak. Patient agrees to return emergency department if symptoms worsen or any new symptoms develop. Clinical  IMPRESSION    1.  Abscess of left hand      Comment: Please note this report has been produced using speech recognition software and may contain errors related to that system including errors in grammar, punctuation, and spelling, as well as words and phrases that may be inappropriate. If there are any questions or concerns please feel free to contact the dictating provider for clarification.       FRENCH Lundberg  01/24/22 49

## 2022-01-29 LAB
CULTURE: ABNORMAL
CULTURE: ABNORMAL
Lab: ABNORMAL
SPECIMEN: ABNORMAL

## 2022-02-01 ENCOUNTER — OFFICE VISIT (OUTPATIENT)
Dept: GASTROENTEROLOGY | Age: 32
End: 2022-02-01
Payer: COMMERCIAL

## 2022-02-01 VITALS
HEART RATE: 90 BPM | WEIGHT: 198.2 LBS | TEMPERATURE: 98.2 F | OXYGEN SATURATION: 95 % | BODY MASS INDEX: 29.36 KG/M2 | HEIGHT: 69 IN | SYSTOLIC BLOOD PRESSURE: 112 MMHG | DIASTOLIC BLOOD PRESSURE: 66 MMHG

## 2022-02-01 DIAGNOSIS — R74.8 ELEVATED LIPASE: ICD-10-CM

## 2022-02-01 DIAGNOSIS — K92.0 HEMATEMESIS WITH NAUSEA: ICD-10-CM

## 2022-02-01 DIAGNOSIS — R11.0 NAUSEA: ICD-10-CM

## 2022-02-01 DIAGNOSIS — R10.13 EPIGASTRIC PAIN: Primary | ICD-10-CM

## 2022-02-01 DIAGNOSIS — Z01.818 PREOP TESTING: ICD-10-CM

## 2022-02-01 PROCEDURE — 1111F DSCHRG MED/CURRENT MED MERGE: CPT | Performed by: NURSE PRACTITIONER

## 2022-02-01 PROCEDURE — 99204 OFFICE O/P NEW MOD 45 MIN: CPT | Performed by: NURSE PRACTITIONER

## 2022-02-01 RX ORDER — OMEPRAZOLE 20 MG/1
20 CAPSULE, DELAYED RELEASE ORAL 2 TIMES DAILY
Qty: 60 CAPSULE | Refills: 3 | Status: SHIPPED | OUTPATIENT
Start: 2022-02-01

## 2022-02-01 RX ORDER — ACETAMINOPHEN 325 MG/1
650 TABLET ORAL EVERY 6 HOURS PRN
COMMUNITY

## 2022-02-01 RX ORDER — OXCARBAZEPINE 600 MG/1
600 TABLET, FILM COATED ORAL 2 TIMES DAILY
COMMUNITY

## 2022-02-01 ASSESSMENT — ENCOUNTER SYMPTOMS
VOMITING: 0
COLOR CHANGE: 0
CONSTIPATION: 0
NAUSEA: 1
ABDOMINAL PAIN: 1
COUGH: 0
WHEEZING: 0
EYE DISCHARGE: 0
BACK PAIN: 0
EYE PAIN: 0
DIARRHEA: 0
BLOOD IN STOOL: 0
SHORTNESS OF BREATH: 0

## 2022-02-01 NOTE — PROGRESS NOTES
palpitations and leg swelling. Gastrointestinal: Positive for abdominal pain and nausea. Negative for blood in stool, constipation, diarrhea and vomiting. Endocrine: Negative for cold intolerance and heat intolerance. Genitourinary: Negative for dysuria, frequency, hematuria and urgency. Musculoskeletal: Negative for back pain, myalgias and neck pain. Skin: Negative for color change, pallor and rash. Allergic/Immunologic: Negative for environmental allergies and food allergies. Neurological: Negative for dizziness, seizures, weakness and headaches. Hematological: Bruises/bleeds easily. Psychiatric/Behavioral: Negative for dysphoric mood and sleep disturbance. The patient is not nervous/anxious. Allergies  Allergies   Allergen Reactions    Pcn [Penicillins] Itching       Medications  Current Outpatient Medications   Medication Sig Dispense Refill    acetaminophen (TYLENOL) 325 MG tablet Take 650 mg by mouth every 6 hours as needed for Pain      OXcarbazepine (TRILEPTAL) 600 MG tablet Take 600 mg by mouth 2 times daily (Patient not taking: Reported on 2/1/2022)      omeprazole (PRILOSEC) 20 MG delayed release capsule Take 1 capsule by mouth every morning (before breakfast) (Patient not taking: Reported on 2/1/2022) 30 capsule 0    ondansetron (ZOFRAN ODT) 4 MG disintegrating tablet Take 1 tablet by mouth every 6 hours (Patient not taking: Reported on 2/1/2022) 10 tablet 0    dicyclomine (BENTYL) 10 MG capsule Take 2 capsules by mouth 4 times daily (before meals and nightly) (Patient not taking: Reported on 2/1/2022) 30 capsule 0    naproxen (NAPROSYN) 500 MG tablet Take 1 tablet by mouth every 12 hours as needed for Pain (Patient not taking: Reported on 2/1/2022) 60 tablet 0    lithium 300 MG capsule Take 300 mg by mouth 3 times daily (with meals). Out of medicine -  (Patient not taking: Reported on 2/1/2022)      aripiprazole (ABILIFY) 15 MG tablet Take 15 mg by mouth daily. (Patient not taking: Reported on 2/1/2022)       No current facility-administered medications for this visit. Past medical history:   He has a past medical history of Hypertension. Past surgical history:  He has a past surgical history that includes Finger amputation. Social History:  He reports that he has been smoking cigarettes. He has a 2.50 pack-year smoking history. He has never used smokeless tobacco. He reports current alcohol use. He reports that he does not use drugs. Family history:  His family history is not on file. Objective    Vitals:    02/01/22 1417   BP: 112/66   Pulse: 90   Temp: 98.2 °F (36.8 °C)   SpO2: 95%        Physical exam    Physical Exam  Constitutional:       General: He is not in acute distress. Appearance: Normal appearance. He is well-developed. He is not ill-appearing, toxic-appearing or diaphoretic. HENT:      Head: Normocephalic and atraumatic. Nose: Nose normal.      Mouth/Throat:      Mouth: Mucous membranes are moist.   Cardiovascular:      Rate and Rhythm: Normal rate and regular rhythm. Pulses: Normal pulses. Heart sounds: Normal heart sounds. No murmur heard. No gallop. Pulmonary:      Effort: Pulmonary effort is normal. No respiratory distress. Breath sounds: Normal breath sounds. No stridor. No wheezing or rhonchi. Abdominal:      General: Bowel sounds are normal. There is no distension. Palpations: Abdomen is soft. There is no mass. Tenderness: There is abdominal tenderness (mild epigastric). Hernia: No hernia is present. Musculoskeletal:         General: Normal range of motion. Cervical back: Neck supple. Skin:     General: Skin is warm and dry. Neurological:      Mental Status: He is alert and oriented to person, place, and time.    Psychiatric:         Mood and Affect: Mood normal.         Admission on 01/23/2022, Discharged on 01/24/2022   Component Date Value Ref Range Status    Specimen 01/24/2022 ABSCESS   Final    Special Requests 01/24/2022 NONE   Final    Culture 01/24/2022 Final Report No anaerobes isolated   Final    Culture 01/24/2022 [facility] AUREUS MRSA Moderate growth CONTACT PRECAUTIONS INDICATED*  Final   Admission on 01/10/2022, Discharged on 01/10/2022   Component Date Value Ref Range Status    WBC 01/10/2022 6.8  4.0 - 10.5 K/CU MM Final    RBC 01/10/2022 5.90  4.6 - 6.2 M/CU MM Final    Hemoglobin 01/10/2022 17.0  13.5 - 18.0 GM/DL Final    Hematocrit 01/10/2022 51.2  42 - 52 % Final    MCV 01/10/2022 86.8  78 - 100 FL Final    MCH 01/10/2022 28.8  27 - 31 PG Final    MCHC 01/10/2022 33.2  32.0 - 36.0 % Final    RDW 01/10/2022 13.4  11.7 - 14.9 % Final    Platelets 90/52/4782 220  140 - 440 K/CU MM Final    MPV 01/10/2022 10.1  7.5 - 11.1 FL Final    Differential Type 01/10/2022 AUTOMATED DIFFERENTIAL   Final    Segs Relative 01/10/2022 46.7  36 - 66 % Final    Lymphocytes % 01/10/2022 43.4  24 - 44 % Final    Monocytes % 01/10/2022 8.8* 0 - 4 % Final    Eosinophils % 01/10/2022 0.7  0 - 3 % Final    Basophils % 01/10/2022 0.3  0 - 1 % Final    Segs Absolute 01/10/2022 3.2  K/CU MM Final    Lymphocytes Absolute 01/10/2022 3.0  K/CU MM Final    Monocytes Absolute 01/10/2022 0.6  K/CU MM Final    Eosinophils Absolute 01/10/2022 0.1  K/CU MM Final    Basophils Absolute 01/10/2022 0.0  K/CU MM Final    Nucleated RBC % 01/10/2022 0.0  % Final    Total Nucleated RBC 01/10/2022 0.0  K/CU MM Final    Total Immature Neutrophil 01/10/2022 0.01  K/CU MM Final    Immature Neutrophil % 01/10/2022 0.1  0 - 0.43 % Final    Sodium 01/10/2022 139  135 - 145 MMOL/L Final    Potassium 01/10/2022 4.4  3.5 - 5.1 MMOL/L Final    Chloride 01/10/2022 102  99 - 110 mMol/L Final    CO2 01/10/2022 28  21 - 32 MMOL/L Final    BUN 01/10/2022 8  6 - 23 MG/DL Final    CREATININE 01/10/2022 0.7* 0.9 - 1.3 MG/DL Final    Glucose 01/10/2022 87  70 - 99 MG/DL Final    Calcium 01/10/2022 9.0  8.3 - 10.6 MG/DL Final    Albumin 01/10/2022 4.8  3.4 - 5.0 GM/DL Final    Total Protein 01/10/2022 7.8  6.4 - 8.2 GM/DL Final    Total Bilirubin 01/10/2022 0.6  0.0 - 1.0 MG/DL Final    ALT 01/10/2022 35  10 - 40 U/L Final    AST 01/10/2022 35  15 - 37 IU/L Final    Alkaline Phosphatase 01/10/2022 103  40 - 128 IU/L Final    GFR Non- 01/10/2022 >60  >60 mL/min/1.73m2 Final    GFR  01/10/2022 >60  >60 mL/min/1.73m2 Final    Anion Gap 01/10/2022 9  4 - 16 Final    Lipase 01/10/2022 119* 13 - 60 IU/L Final    Color, UA 01/10/2022 YELLOW  YELLOW Final    Clarity, UA 01/10/2022 CLEAR  CLEAR Final    Glucose, Urine 01/10/2022 NEGATIVE  NEGATIVE MG/DL Final    Bilirubin Urine 01/10/2022 NEGATIVE  NEGATIVE MG/DL Final    Ketones, Urine 01/10/2022 NEGATIVE  NEGATIVE MG/DL Final    Specific Gravity, UA 01/10/2022 1.026  1.001 - 1.035 Final    Blood, Urine 01/10/2022 NEGATIVE  NEGATIVE Final    pH, Urine 01/10/2022 6.0  5.0 - 8.0 Final    Protein, UA 01/10/2022 TRACE* NEGATIVE MG/DL Final    Urobilinogen, Urine 01/10/2022 NORMAL  0.2 - 1.0 MG/DL Final    Nitrite Urine, Quantitative 01/10/2022 NEGATIVE  NEGATIVE Final    Leukocyte Esterase, Urine 01/10/2022 NEGATIVE  NEGATIVE Final    RBC, UA 01/10/2022 <1  0 - 3 /HPF Final    WBC, UA 01/10/2022 1  0 - 2 /HPF Final    Bacteria, UA 01/10/2022 NEGATIVE  NEGATIVE /HPF Final    Squam Epithel, UA 01/10/2022 <1  /HPF Final    Mucus, UA 01/10/2022 MANY* NEGATIVE HPF Final    Cannabinoid Scrn, Ur 01/10/2022 UNCONFIRMED POSITIVE* NEGATIVE Final    Amphetamines 01/10/2022 NEGATIVE  NEGATIVE Final    Cocaine Metabolite 01/10/2022 NEGATIVE  NEGATIVE Final    Benzodiazepine Screen, Urine 01/10/2022 NEGATIVE  NEGATIVE Final    Barbiturate Screen, Ur 01/10/2022 NEGATIVE  NEGATIVE Final    Opiates, Urine 01/10/2022 NEGATIVE  NEGATIVE Final    Phencyclidine, Urine 01/10/2022 NEGATIVE  NEGATIVE Final    Oxycodone 01/10/2022 NEGATIVE  NEGATIVE Final    Comment:         THRESHOLD CONCENTRATIONS (mg/dL)  AMPHT               1000  CHILO,OPIA             300  BZO,BAR              200  PCP                   25  THC                   50  OXY                  100          IF POSITIVE, SPECIMEN WILL BE  DISCARDED AFTER 6 MONTHS. CALL LAB IF CONFIRMATION NEEDED. ALL NEGATIVE SPECIMENS WILL BE  DISCARDED AFTER ONE WEEK. * UNCONFIRMED POSITIVES MAY  NOT MEET FORENSIC REQUIREMENTS. Assessment and Plan:  1. Epigastric pain might be secondary to irritable bowel syndrome, acid reflux, gastritis, or peptic ulcer disease. May consider dietician referral for diet modification. Rule out gallbladder disease with abdominal ultrasound and HIDA scan. The patient was encouraged to eat a low fat diet and avoidance of alcohol. Recommend smoking cessation. 2.  One episode of hematemesis most likely due to [employer] tear or possible peptic ulcer disease. Instructed to stop taking NSAID's. Will order Prilosec 20 mg take once daily for treatment. 3.  Elevated Lipase concerning for acute pancreatitis; his pain is minimal today. Will evaluate for gallbladder disease and Lipid panel to evaluate triglyceride levels. Will reorder Lipase if elevated will order CT of abdomen/pelvis to rule out abnormalities. Recommend low fat diet, avoidance of alcohol and smoking cessation. 4.  Dyspepsia vs. Acid reflux without dysphagia or odynophagia. Will order Prilosec 20 mg take daily for treatment of acid reflux. Recommend taking anti-reflux measures. Avoid foods that trigger. 5.  Will plan for a EGD with MAC anesthesia. The patient was informed of the risks and benefits of the procedure. Further recommendations for follow-up will be determined after the EGD has been completed.

## 2022-02-01 NOTE — PATIENT INSTRUCTIONS
Patient Education        Upper GI Endoscopy: Before Your Procedure  What is an upper GI endoscopy? An upper gastrointestinal (or GI) endoscopy is a test that allows your doctor to look at the inside of your esophagus, stomach, and the first part of your small intestine, called the duodenum. The esophagus is the tube that carries food to your stomach. The doctor uses a thin, lighted tube that bends. It is called an endoscope, or scope. The doctor puts the tip of the scope in your mouth and gently moves it down your throat. The scope is a flexible video camera. The doctor looks at a monitor (like a TV set or a computer screen) as he or she moves the scope. A doctor may do this procedure to look for ulcers, tumors, infection, or bleeding. It also can be used to look for signs of acid backing up into your esophagus. This is called gastroesophageal reflux disease, or GERD. The doctor can use the scope to take a sample of tissue for study (a biopsy). The doctor also can use the scope to take out growths or stop bleeding. Follow-up care is a key part of your treatment and safety. Be sure to make and go to all appointments, and call your doctor if you are having problems. It's also a good idea to know your test results and keep a list of the medicines you take. How do you prepare for the procedure? Procedures can be stressful. This information will help you understand what you can expect. And it will help you safely prepare for your procedure. Preparing for the procedure    · Do not eat or drink anything for 6 to 8 hours before the test. An empty stomach helps your doctor see your stomach clearly during the test. It also reduces your chances of vomiting. If you vomit, there is a small risk that the vomit could enter your lungs.  (This is called aspiration.) If the test is done in an emergency, a tube may be inserted through your nose or mouth to empty your stomach.     · Do not take sucralfate (Carafate) or antacids on the day of the test. These medicines can make it hard for your doctor to see your upper GI tract.     · If your doctor tells you to, stop taking iron supplements 7 to 14 days before the test.     · Be sure you have someone to take you home. Anesthesia and pain medicine will make it unsafe for you to drive or get home on your own.     · Understand exactly what procedure is planned, along with the risks, benefits, and other options. · Tell your doctor ALL the medicines, vitamins, supplements, and herbal remedies you take. Some may increase the risk of problems during your procedure. Your doctor will tell you if you should stop taking any of them before the procedure and how soon to do it.     · If you take aspirin or some other blood thinner, ask your doctor if you should stop taking it before your procedure. Make sure that you understand exactly what your doctor wants you to do. These medicines increase the risk of bleeding.     · Make sure your doctor and the hospital have a copy of your advance directive. If you don't have one, you may want to prepare one. It lets others know your health care wishes. It's a good thing to have before any type of surgery or procedure. What happens on the day of the procedure? · Follow the instructions exactly about when to stop eating and drinking. If you don't, your procedure may be canceled. If your doctor told you to take your medicines on the day of the procedure, take them with only a sip of water.     · Take a bath or shower before you come in for your procedure. Do not apply lotions, perfumes, deodorants, or nail polish.     · Take off all jewelry and piercings. And take out contact lenses, if you wear them. At the hospital or surgery center   · Bring a picture ID.     · The test may take 15 to 30 minutes.     · The doctor may spray medicine on the back of your throat to numb it.  You also will get medicine to prevent pain and to relax you.     · You will lie on your left side. The doctor will put the scope in your mouth and toward the back of your throat. The doctor will tell you when to swallow. This helps the scope move down your throat. You will be able to breathe normally. The doctor will move the scope down your esophagus into your stomach. The doctor also may look at the duodenum.     · If your doctor wants to take a sample of tissue for a biopsy, he or she may use small surgical tools, which are put into the scope, to cut off some tissue. You will not feel a biopsy, if one is taken. The doctor also can use the tools to stop bleeding or to do other treatments, if needed.     · You will stay at the hospital or surgery center for 1 to 2 hours until the medicine you were given wears off. What happens after an upper GI endoscopy? · After the test, you may belch and feel bloated for a while.     · You may have a tickling, dry throat or mouth. You may feel a bit hoarse, and you may have a mild sore throat. These symptoms may last several days. Throat lozenges and warm saltwater gargles can help relieve the throat symptoms.     · Ask your doctor when you can drive again.     · Your doctor will tell you when you can go back to your usual diet and activities.     · Don't drink alcohol for 12 to 24 hours after the test.   When should you call your doctor? · You have questions or concerns.     · You don't understand how to prepare for your procedure.     · You become ill before the procedure (such as fever, flu, or a cold).     · You need to reschedule or have changed your mind about having the procedure. Where can you learn more? Go to [email or web address] and sign in to your [email or web address] account. Enter P790 in the [employer] box to learn more about \"Upper GI Endoscopy: Before Your Procedure. \"     If you do not have an account, please click on the \"Sign Up Now\" link.   Current as of: September 8, 2021               Content Version: 13.1  © 2754-2939 Healthwise, [employer]. Care instructions adapted under license by [facility] ([facility]). If you have questions about a medical condition or this instruction, always ask your healthcare professional. [address] any warranty or liability for your use of this information. Patient Education        omeprazole  Pronunciation:  oh MEP ra zol  Brand:  FIRST Omeprazole, Omeprazole + SyrSpend SF [place], PriLOSEC, PriLOSEC OTC  What is the most important information I should know about omeprazole? Omeprazole can cause kidney problems. Tell your doctor if you are urinating less than usual, or if you have blood in your urine. Diarrhea may be a sign of a new infection. Call your doctor if you have diarrhea that is watery or has blood in it. Omeprazole may cause new or worsening symptoms of lupus. Tell your doctor if you have joint pain and a skin rash on your cheeks or arms that worsens in sunlight. You may be more likely to have a broken bone while taking this medicine long term or more than once per day. What is omeprazole? Omeprazole is used to treat symptoms of gastroesophageal reflux disease (GERD) and other conditions caused by excess stomach acid. Omeprazole is also used to promote healing of erosive esophagitis (damage to your esophagus caused by stomach acid). Omeprazole may also be given together with antibiotics to treat gastric ulcer caused by infection with Helicobacter pylori (H. pylori). Over-the-counter (OTC) omeprazole is used in adults to help control heartburn that occurs 2 or more days per week. This medicine not for immediate relief of heartburn symptoms. OTC omeprazole must be taken on a regular basis for 14 days in a row. Omeprazole may also be used for purposes not listed in this medication guide. What should I discuss with my healthcare provider before taking omeprazole? Heartburn can mimic early symptoms of a heart attack.  Get emergency medical help if you have chest pain that spreads to your jaw or shoulder and you feel sweaty or light-headed. You should not use omeprazole if you are allergic to it, or if:  · you are also allergic to medicines like omeprazole, such as esomeprazole, lansoprazole, pantoprazole, rabeprazole, Nexium, Prevacid, Protonix, and others;  · you had breathing problems, kidney problems, or a severe allergic reaction after taking omeprazole in the past; or  · you also take HIV medication that contains rilpivirine (such as Verla Gonzalo, Branda Patterson). Ask a doctor or pharmacist if this medicine is safe to use if you have:  · trouble or pain with swallowing;  · bloody or black stools, vomit that looks like blood or coffee grounds;  · heartburn that has lasted for over 3 months;  · frequent chest pain, heartburn with wheezing;  · unexplained weight loss;  · nausea or vomiting, stomach pain;  · liver disease;  · low levels of magnesium in your blood; or  · osteoporosis or low bone mineral density (osteopenia). You may be more likely to have a broken bone in your hip, wrist, or spine while taking a proton pump inhibitor long-term or more than once per day. Talk with your doctor about ways to keep your bones healthy. Ask a doctor before using this medicine if you are pregnant or breastfeeding. Do not give this medicine to a child without medical advice. How should I take omeprazole? Follow all directions on your prescription label and read all medication guides or instruction sheets. Use the medicine exactly as directed. Use Prilosec OTC (over-the-counter) exactly as directed on the label, or as prescribed by your doctor. Read and carefully follow any Instructions for Use provided with your medicine. Ask your doctor or pharmacist if you do not understand these instructions. Shake the oral suspension (liquid) before you measure a dose. Use the dosing syringe provided, or use a medicine dose-measuring device (not a kitchen spoon).   If you cannot swallow a capsule whole, open it and sprinkle the medicine into a spoonful of applesauce. Swallow the mixture right away without chewing. Do not save it for later use. You must dissolve omeprazole powder in a small amount of water. This mixture can either be swallowed or given through a nasogastric (NG) feeding tube using a catheter-tipped syringe. Use this medicine for the full prescribed length of time, even if your symptoms quickly improve. OTC omeprazole should be taken for only 14 days in a row. It may take 1 to 4 days before your symptoms improve. Allow at least 4 months to pass before you start a new 14-day course of treatment. Call your doctor if your symptoms do not improve, or if they get worse. Some conditions are treated with a combination of omeprazole and antibiotics. Use all medications as directed. This medicine can affect the results of certain medical tests. Tell any doctor who treats you that you are using omeprazole. Store at room temperature away from moisture and heat. What happens if I miss a dose? Take the medicine as soon as you can, but skip the missed dose if it is almost time for your next dose. Do not take two doses at one time. What happens if I overdose? Seek emergency medical attention or call the Poison Help line at 1-923.718.3258. What should I avoid while taking omeprazole? This medicine can cause diarrhea, which may be a sign of a new infection. If you have diarrhea that is watery or bloody, call your doctor before using anti-diarrhea medicine. What are the possible side effects of omeprazole? Get emergency medical help if you have signs of an allergic reaction: hives; difficulty breathing; swelling of your face, lips, tongue, or throat.   Stop using omeprazole and call your doctor at once if you have:  · severe stomach pain, diarrhea that is watery or bloody;  · new or unusual pain in your wrist, thigh, hip, or back;  · seizure (convulsions);  · kidney problems --fever, rash, nausea, loss of appetite, joint pain, urinating less than usual, blood in your urine, weight gain;  · low magnesium --dizziness, irregular heartbeats, feeling jittery, muscle cramps, muscle spasms, cough or choking feeling; or  · new or worsening symptoms of lupus --joint pain, and a skin rash on your cheeks or arms that worsens in sunlight. Taking omeprazole long-term may cause you to develop stomach growths called fundic gland polyps. Talk with your doctor about this risk. If you use omeprazole for longer than 3 years, you could develop a vitamin B-12 deficiency. Talk to your doctor about how to manage this condition if you develop it. Common side effects may include:  · cold symptoms such as stuffy nose, sneezing, sore throat (especially in children);  · fever (especially in children);  · stomach pain, gas;  · nausea, vomiting, diarrhea; or  · headache. This is not a complete list of side effects and others may occur. Call your doctor for medical advice about side effects. You may report side effects to FDA at 5-965-FDA-9005. What other drugs will affect omeprazole? Sometimes it is not safe to use certain medications at the same time. Some drugs can affect your blood levels of other drugs you take, which may increase side effects or make the medications less effective. Tell your doctor about all your current medicines. Many drugs can affect omeprazole, especially:  · digoxin;  · clopidogrel;  · methotrexate;  · Tim's wort;  · a diuretic or \"water pill\"; or  · an antibiotic --amoxicillin, clarithromycin, rifampin. This list is not complete and many other drugs may affect omeprazole. This includes prescription and over-the-counter medicines, vitamins, and herbal products. Not all possible drug interactions are listed here. Where can I get more information? Your pharmacist can provide more information about omeprazole.   Remember, keep this and all other medicines out of the reach of children, never share your medicines with others, and use this medication only for the indication prescribed. Every effort has been made to ensure that the information provided by [address]leyda [address] is accurate, up-to-date, and complete, but no guarantee is made to that effect. Drug information contained herein may be time sensitive. [facility] information has been compiled for use by healthcare practitioners and consumers in the [place] and therefore [facility] does not warrant that uses outside of the [place] are appropriate, unless specifically indicated otherwise. [facility]'s drug information does not endorse drugs, diagnose patients or recommend therapy. [facility]'s drug information is an informational resource designed to assist licensed healthcare practitioners in caring for their patients and/or to serve consumers viewing this service as a supplement to, and not a substitute for, the expertise, skill, knowledge and judgment of healthcare practitioners. The absence of a warning for a given drug or drug combination in no way should be construed to indicate that the drug or drug combination is safe, effective or appropriate for any given patient. [facility] does not assume any responsibility for any aspect of healthcare administered with the aid of information [facility] provides. The information contained herein is not intended to cover all possible uses, directions, precautions, warnings, drug interactions, allergic reactions, or adverse effects. If you have questions about the drugs you are taking, check with your doctor, nurse or pharmacist.  Copyright 0334-4488 [address]. Version: 21.01. Revision date: 1/4/2021. Care instructions adapted under license by [facility] ([facility]). If you have questions about a medical condition or this instruction, always ask your healthcare professional. [place] [address] any warranty or liability for your use of this information.

## 2022-02-02 ENCOUNTER — PREP FOR PROCEDURE (OUTPATIENT)
Dept: GASTROENTEROLOGY | Age: 32
End: 2022-02-02

## 2022-02-02 RX ORDER — SODIUM CHLORIDE, SODIUM LACTATE, POTASSIUM CHLORIDE, CALCIUM CHLORIDE 600; 310; 30; 20 MG/100ML; MG/100ML; MG/100ML; MG/100ML
INJECTION, SOLUTION INTRAVENOUS CONTINUOUS
Status: CANCELLED | OUTPATIENT
Start: 2022-02-02

## 2022-02-02 RX ORDER — SODIUM CHLORIDE 9 MG/ML
25 INJECTION, SOLUTION INTRAVENOUS PRN
Status: CANCELLED | OUTPATIENT
Start: 2022-02-02

## 2022-02-02 RX ORDER — SODIUM CHLORIDE 0.9 % (FLUSH) 0.9 %
5-40 SYRINGE (ML) INJECTION EVERY 12 HOURS SCHEDULED
Status: CANCELLED | OUTPATIENT
Start: 2022-02-02

## 2022-02-02 RX ORDER — SODIUM CHLORIDE 0.9 % (FLUSH) 0.9 %
5-40 SYRINGE (ML) INJECTION PRN
Status: CANCELLED | OUTPATIENT
Start: 2022-02-02

## 2022-02-07 ENCOUNTER — TELEPHONE (OUTPATIENT)
Dept: GASTROENTEROLOGY | Age: 32
End: 2022-02-07

## 2022-02-07 NOTE — TELEPHONE ENCOUNTER
Per my call to Zara Ireland automated; No precert required for in network providers. Ref# [id number]. Active coverage effective 12/30/21.

## 2022-06-30 ENCOUNTER — HOSPITAL ENCOUNTER (EMERGENCY)
Age: 32
Discharge: HOME | End: 2022-07-01

## 2022-06-30 DIAGNOSIS — K64.4 EXTERNAL HEMORRHOID, BLEEDING: Primary | ICD-10-CM

## 2022-06-30 PROCEDURE — 99283 EMERGENCY DEPT VISIT LOW MDM: CPT

## 2022-06-30 ASSESSMENT — PAIN DESCRIPTION - ORIENTATION: ORIENTATION: LOWER;RIGHT

## 2022-06-30 ASSESSMENT — PAIN DESCRIPTION - DESCRIPTORS: DESCRIPTORS: STABBING

## 2022-06-30 ASSESSMENT — PAIN DESCRIPTION - PAIN TYPE: TYPE: ACUTE PAIN

## 2022-06-30 ASSESSMENT — PAIN SCALES - GENERAL: PAINLEVEL_OUTOF10: 5

## 2022-06-30 ASSESSMENT — PAIN DESCRIPTION - LOCATION: LOCATION: ABDOMEN

## 2022-06-30 ASSESSMENT — PAIN DESCRIPTION - FREQUENCY: FREQUENCY: INTERMITTENT

## 2022-06-30 NOTE — Clinical Note
Shanti Ayala was seen and treated in our emergency department on 6/30/2022. He may return to work on 07/01/2022. If you have any questions or concerns, please don't hesitate to call.       Calin Perkins PA-C

## 2022-07-01 VITALS
WEIGHT: 170 LBS | TEMPERATURE: 98.1 F | DIASTOLIC BLOOD PRESSURE: 79 MMHG | BODY MASS INDEX: 25.18 KG/M2 | SYSTOLIC BLOOD PRESSURE: 140 MMHG | HEART RATE: 84 BPM | RESPIRATION RATE: 16 BRPM | HEIGHT: 69 IN | OXYGEN SATURATION: 99 %

## 2022-07-01 PROCEDURE — 86900 BLOOD TYPING SEROLOGIC ABO: CPT

## 2022-07-01 PROCEDURE — 86850 RBC ANTIBODY SCREEN: CPT

## 2022-07-01 RX ORDER — HYDROCORTISONE 25 MG/G
CREAM TOPICAL
Qty: 28 G | Refills: 0 | Status: SHIPPED | OUTPATIENT
Start: 2022-07-01

## 2022-07-01 NOTE — ED PROVIDER NOTES
file   [email or web address] of Exercise per Session: Not on file   Stress:     Feeling of Stress : Not on file   Social Connections:     Frequency of Communication with Friends and Family: Not on file    Frequency of Social Gatherings with Friends and Family: Not on file    Attends Protestant Services: Not on file    Active Member of Clubs or Organizations: Not on file    Attends Club or Organization Meetings: Not on file    Marital Status: Not on file   Intimate Partner Violence:     Fear of Current or Ex-Partner: Not on file    Emotionally Abused: Not on file    Physically Abused: Not on file    Sexually Abused: Not on file   Housing Stability:     Unable to Pay for Housing in the Last Year: Not on file    Number of [place] in the Last Year: Not on file    Unstable Housing in the Last Year: Not on file     No current facility-administered medications for this encounter. Current Outpatient Medications   Medication Sig Dispense Refill    hydrocortisone (ANUSOL-HC) 2.5 % CREA rectal cream Place on rectum twice daily x 7 days 28 g 0    OXcarbazepine (TRILEPTAL) 600 MG tablet Take 600 mg by mouth 2 times daily (Patient not taking: Reported on 2/1/2022)      acetaminophen (TYLENOL) 325 MG tablet Take 650 mg by mouth every 6 hours as needed for Pain      omeprazole (PRILOSEC) 20 MG delayed release capsule Take 1 capsule by mouth 2 times daily 60 capsule 3    ondansetron (ZOFRAN ODT) 4 MG disintegrating tablet Take 1 tablet by mouth every 6 hours (Patient not taking: Reported on 2/1/2022) 10 tablet 0    dicyclomine (BENTYL) 10 MG capsule Take 2 capsules by mouth 4 times daily (before meals and nightly) (Patient not taking: Reported on 2/1/2022) 30 capsule 0    naproxen (NAPROSYN) 500 MG tablet Take 1 tablet by mouth every 12 hours as needed for Pain (Patient not taking: Reported on 2/1/2022) 60 tablet 0    lithium 300 MG capsule Take 300 mg by mouth 3 times daily (with meals).  Out of medicine - (Patient not taking: Reported on 2/1/2022)      aripiprazole (ABILIFY) 15 MG tablet Take 15 mg by mouth daily. (Patient not taking: Reported on 2/1/2022)       Allergies   Allergen Reactions    Pcn [Penicillins] Itching       Nursing Notes Reviewed    Physical Exam:  ED Triage Vitals [06/30/22 2312]   Enc Vitals Group      BP (!) 140/79      Heart Rate 87      Resp 14      Temp 98.1 °F (36.7 °C)      Temp Source Oral      SpO2 98 %      Weight 170 lb (77.1 kg)      Height 5' 9\" (1.753 m)      Head Circumference       Peak Flow       Pain Score       Pain Loc       Pain Edu? Excl. in [address]? General :Patient is awake alert oriented person place and time no acute distress nontoxic appearing  HEENT: Pupils are equally round and reactive to light extraocular motors are intact conjunctivae clear sclerae white there is no injection no icterus. Nose without any rhinorrhea or epistaxis. Oral mucosa is moist no exudate buccal mucosa shows no ulcerations. Uvula is midline    Neck: Neck is supple full range of motion trachea midline thyroid nonpalpable  Cardiac: Heart regular rate rhythm no murmurs rubs clicks or gallops  Lungs: Lungs are clear to auscultation there is no wheezing rhonchi or rales. There is no use of accessory muscles no nasal flaring identified. Chest wall: There is no tenderness to palpation over the chest wall or over ribs  Abdomen: Abdomen is soft nontender nondistended. There is no firm or pulsatile masses no rebound rigidity or guarding negative Culver's negative McBurney, no peritoneal signs  Suprapubic:  there is no tenderness to palpation over the external bladder   RECTAL:  No external masses, erythema, or bleeding. Small grade 2 hemorrhoid with scab noted. No thrombosis. There is no tenderness to palpation. Rectal tone is  Musculoskeletal: 5 out of 5 strength in all 4 extremities full flexion extension abduction and adduction supination pronation of all extremities and all digits.  No obvious muscle atrophy is noted. No focal muscle deficits are appreciated  Dermatology: Skin is warm and dry there is no obvious abscesses lacerations or lesions noted  Psych: Mentation is grossly normal cognition is grossly normal. Affect is appropriate  Neuro: Motor intact sensory intact cranial nerves II through XII are intact level of consciousness is normal cerebellar function is normal reflexes are grossly normal. No evidence of incontinence or loss of bowel or bladder no saddle anesthesia noted Lymphatic: There is no submandibular or cervical adenopathy appreciated. I have reviewed and interpreted all of the currently available lab results from this visit (if applicable):  No results found for this visit on 06/30/22. Radiographs (if obtained):  [] The following radiograph was interpreted by myself in the absence of a radiologist:   [] Radiologist's Report Reviewed:  No orders to display       EKG (if obtained):   Please See Note of attending physician for EKG interpretation. Chart review shows recent radiograph(s):  No results found. MDM:     Interventions given this visit:   Orders Placed This Encounter   Medications    hydrocortisone (ANUSOL-HC) 2.5 % CREA rectal cream     Sig: Place on rectum twice daily x 7 days     Dispense:  28 g     Refill:  0     Pt has bleeding external hemorrhoid. hemodnamically stable. No hemorrhage. I independently managed patient today in the ED\    BP (!) 140/79   Pulse 87   Temp 98.1 °F (36.7 °C) (Oral)   Resp 14   Ht 5' 9\" (1.753 m)   Wt 170 lb (77.1 kg)   SpO2 98%   BMI 25.10 kg/m²       Clinical Impression:  1.  External hemorrhoid, bleeding        Disposition referral (if applicable):  [facility]  [place] [id number] [phone]    If symptoms worsen or persist    Disposition medications (if applicable):  New Prescriptions    HYDROCORTISONE (ANUSOL-HC) 2.5 % CREA RECTAL CREAM    Place on rectum twice daily x 7 days         Comment: Please note this report has been produced using speech recognition software and may contain errors related to that system including errors in grammar, punctuation, and spelling, as well as words and phrases that may be inappropriate. If there are any questions or concerns please feel free to contact the dictating provider for clarification.       Jero Reynaga, [address] [place] [address]  07/01/22 0025

## 2022-07-01 NOTE — ED NOTES
Mónica BRASWELL speaking to lab at this time cancelling bloodwork     Ricky Denis, JONEL  07/01/22 0020

## 2022-07-01 NOTE — ED NOTES
Pt requesting to not have IV placed at this time. Blood drawn via straight stick per pt request. Pt is aware he may need an IV placed.      Ronny Sage RN  07/01/22 1252

## 2022-07-28 ENCOUNTER — HOSPITAL ENCOUNTER (EMERGENCY)
Age: 32
Discharge: LEFT BEFORE DISCHARGE | End: 2022-07-28

## 2022-07-28 VITALS
HEIGHT: 69 IN | BODY MASS INDEX: 28.73 KG/M2 | OXYGEN SATURATION: 97 % | DIASTOLIC BLOOD PRESSURE: 79 MMHG | WEIGHT: 194 LBS | TEMPERATURE: 98.3 F | HEART RATE: 73 BPM | SYSTOLIC BLOOD PRESSURE: 125 MMHG | RESPIRATION RATE: 16 BRPM

## 2022-07-28 ASSESSMENT — PAIN DESCRIPTION - LOCATION: LOCATION: RECTUM

## 2022-07-28 ASSESSMENT — PAIN SCALES - GENERAL: PAINLEVEL_OUTOF10: 8

## 2022-07-28 ASSESSMENT — PAIN DESCRIPTION - FREQUENCY: FREQUENCY: INTERMITTENT

## 2022-07-28 NOTE — ACP (ADVANCE CARE PLANNING)
Patient does not have any ACP documents/Medical Power of . LSW notes hospital will follow [place]'s Next of Kin hierarchy in the following descending order for priority:    Guardian  Spouse  [de-identified] of adult Children  Parents  [de-identified] of adult Siblings  Nearest Relative not described above    Per [place] Next of Kin hierarchy: Patients' parent will be [address].

## 2023-03-15 ENCOUNTER — HOSPITAL ENCOUNTER (EMERGENCY)
Age: 33
Discharge: LEFT BEFORE DISCHARGE | End: 2023-03-15

## 2023-03-15 VITALS
TEMPERATURE: 98.4 F | OXYGEN SATURATION: 99 % | RESPIRATION RATE: 18 BRPM | SYSTOLIC BLOOD PRESSURE: 154 MMHG | HEART RATE: 91 BPM | DIASTOLIC BLOOD PRESSURE: 93 MMHG

## 2023-03-15 DIAGNOSIS — Z00.00 WELL ADULT HEALTH CHECK: Primary | ICD-10-CM

## 2023-03-16 NOTE — ED NOTES
No answer when called for a room in emergency department waiting room.       Kateryna Negrete RN  03/15/23 0326

## 2024-05-13 ENCOUNTER — HOSPITAL ENCOUNTER (EMERGENCY)
Age: 34
Discharge: HOME | End: 2024-05-14
Attending: EMERGENCY MEDICINE
Payer: COMMERCIAL

## 2024-05-13 ENCOUNTER — APPOINTMENT (OUTPATIENT)
Dept: GENERAL RADIOLOGY | Age: 34
End: 2024-05-13
Payer: COMMERCIAL

## 2024-05-13 DIAGNOSIS — T07.XXXA ABRASIONS OF MULTIPLE SITES: ICD-10-CM

## 2024-05-13 DIAGNOSIS — S09.90XA CLOSED HEAD INJURY, INITIAL ENCOUNTER: ICD-10-CM

## 2024-05-13 DIAGNOSIS — S01.81XA CHIN LACERATION, INITIAL ENCOUNTER: ICD-10-CM
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
## 2024-05-13 DIAGNOSIS — M79.604 ACUTE LEG PAIN, RIGHT: ICD-10-CM

## 2024-05-13 PROCEDURE — 99285 EMERGENCY DEPT VISIT HI MDM: CPT

## 2024-05-13 PROCEDURE — 73552 X-RAY EXAM OF FEMUR 2/>: CPT

## 2024-05-13 PROCEDURE — 96374 THER/PROPH/DIAG INJ IV PUSH: CPT

## 2024-05-13 PROCEDURE — 12011 RPR F/E/E/N/L/M 2.5 CM/<: CPT

## 2024-05-13 PROCEDURE — 96375 TX/PRO/DX INJ NEW DRUG ADDON: CPT

## 2024-05-13 RX ORDER — FENTANYL CITRATE 50 UG/ML
50 INJECTION, SOLUTION INTRAMUSCULAR; INTRAVENOUS ONCE
Status: COMPLETED | OUTPATIENT
Start: 2024-05-13 | End: 2024-05-14

## 2024-05-13 RX ORDER — ONDANSETRON 2 MG/ML
4 INJECTION INTRAMUSCULAR; INTRAVENOUS ONCE
Status: COMPLETED | OUTPATIENT
Start: 2024-05-13 | End: 2024-05-14

## 2024-05-13 ASSESSMENT — PAIN DESCRIPTION - LOCATION: LOCATION: LEG

## 2024-05-13 ASSESSMENT — PAIN DESCRIPTION - DESCRIPTORS: DESCRIPTORS: BURNING

## 2024-05-13 ASSESSMENT — PAIN DESCRIPTION - ORIENTATION: ORIENTATION: RIGHT

## 2024-05-13 ASSESSMENT — PAIN SCALES - GENERAL: PAINLEVEL_OUTOF10: 7

## 2024-05-13 ASSESSMENT — PAIN DESCRIPTION - ONSET: ONSET: ON-GOING

## 2024-05-13 ASSESSMENT — PAIN DESCRIPTION - FREQUENCY: FREQUENCY: CONTINUOUS

## 2024-05-13 ASSESSMENT — PAIN DESCRIPTION - PAIN TYPE: TYPE: ACUTE PAIN

## 2024-05-13 ASSESSMENT — PAIN - FUNCTIONAL ASSESSMENT: PAIN_FUNCTIONAL_ASSESSMENT: 0-10

## 2024-05-14 ENCOUNTER — APPOINTMENT (OUTPATIENT)
Dept: CT IMAGING | Age: 34
End: 2024-05-14
Payer: COMMERCIAL

## 2024-05-14 ENCOUNTER — APPOINTMENT (OUTPATIENT)
Dept: GENERAL RADIOLOGY | Age: 34
End: 2024-05-14
Payer: COMMERCIAL

## 2024-05-14 VITALS
OXYGEN SATURATION: 98 % | RESPIRATION RATE: 16 BRPM | TEMPERATURE: 98 F | DIASTOLIC BLOOD PRESSURE: 72 MMHG | SYSTOLIC BLOOD PRESSURE: 126 MMHG | HEART RATE: 74 BPM

## 2024-05-14 PROCEDURE — 71260 CT THORAX DX C+: CPT

## 2024-05-14 PROCEDURE — 73590 X-RAY EXAM OF LOWER LEG: CPT

## 2024-05-14 PROCEDURE — 12011 RPR F/E/E/N/L/M 2.5 CM/<: CPT

## 2024-05-14 PROCEDURE — 6370000000 HC RX 637 (ALT 250 FOR IP): Performed by: EMERGENCY MEDICINE

## 2024-05-14 PROCEDURE — 2500000003 HC RX 250 WO HCPCS: Performed by: EMERGENCY MEDICINE

## 2024-05-14 PROCEDURE — 6360000004 HC RX CONTRAST MEDICATION: Performed by: EMERGENCY MEDICINE

## 2024-05-14 PROCEDURE — 70486 CT MAXILLOFACIAL W/O DYE: CPT

## 2024-05-14 PROCEDURE — 72125 CT NECK SPINE W/O DYE: CPT

## 2024-05-14 PROCEDURE — 96374 THER/PROPH/DIAG INJ IV PUSH: CPT

## 2024-05-14 PROCEDURE — 70450 CT HEAD/BRAIN W/O DYE: CPT

## 2024-05-14 PROCEDURE — 96375 TX/PRO/DX INJ NEW DRUG ADDON: CPT

## 2024-05-14 PROCEDURE — 6360000002 HC RX W HCPCS: Performed by: EMERGENCY MEDICINE

## 2024-05-14 RX ORDER — SODIUM CHLORIDE 0.9 % (FLUSH) 0.9 %
5-40 SYRINGE (ML) INJECTION 2 TIMES DAILY
Status: DISCONTINUED | OUTPATIENT
Start: 2024-05-14 | End: 2024-05-14 | Stop reason: HOSPADM

## 2024-05-14 RX ORDER — GINSENG 100 MG
CAPSULE ORAL ONCE
Status: COMPLETED | OUTPATIENT
Start: 2024-05-14 | End: 2024-05-14

## 2024-05-14 RX ORDER — ACETAMINOPHEN 500 MG
500 TABLET ORAL 4 TIMES DAILY PRN
Qty: 30 TABLET | Refills: 0 | Status: SHIPPED | OUTPATIENT
Start: 2024-05-14

## 2024-05-14 RX ORDER — LIDOCAINE HYDROCHLORIDE 10 MG/ML
5 INJECTION, SOLUTION INFILTRATION; PERINEURAL ONCE
Status: COMPLETED | OUTPATIENT
Start: 2024-05-14 | End: 2024-05-14

## 2024-05-14 RX ORDER — ACETAMINOPHEN 500 MG
1000 TABLET ORAL ONCE
Status: COMPLETED | OUTPATIENT
Start: 2024-05-14 | End: 2024-05-14

## 2024-05-14 RX ORDER — METHOCARBAMOL 500 MG/1
500 TABLET, FILM COATED ORAL 3 TIMES DAILY
Qty: 30 TABLET | Refills: 0 | Status: SHIPPED | OUTPATIENT
Start: 2024-05-14 | End: 2024-05-24

## 2024-05-14 RX ORDER — NAPROXEN 500 MG/1
500 TABLET ORAL 2 TIMES DAILY WITH MEALS
Qty: 30 TABLET | Refills: 0 | Status: SHIPPED | OUTPATIENT
Start: 2024-05-14

## 2024-05-14 RX ADMIN — BACITRACIN: 500 OINTMENT TOPICAL at 02:51

## 2024-05-14 RX ADMIN — ONDANSETRON 4 MG: 2 INJECTION INTRAMUSCULAR; INTRAVENOUS at 00:07

## 2024-05-14 RX ADMIN — FENTANYL CITRATE 50 MCG: 50 INJECTION, SOLUTION INTRAMUSCULAR; INTRAVENOUS at 00:08

## 2024-05-14 RX ADMIN — IOPAMIDOL 75 ML: 755 INJECTION, SOLUTION INTRAVENOUS at 00:35

## 2024-05-14 RX ADMIN — ACETAMINOPHEN 1000 MG: 500 TABLET ORAL at 00:45

## 2024-05-14 RX ADMIN — LIDOCAINE HYDROCHLORIDE 5 ML: 10 INJECTION, SOLUTION INFILTRATION; PERINEURAL at 02:55

## 2024-05-14 ASSESSMENT — PAIN DESCRIPTION - DESCRIPTORS
DESCRIPTORS: ACHING
DESCRIPTORS: BURNING
DESCRIPTORS: BURNING

## 2024-05-14 ASSESSMENT — PAIN SCALES - GENERAL
PAINLEVEL_OUTOF10: 5
PAINLEVEL_OUTOF10: 3
PAINLEVEL_OUTOF10: 7
PAINLEVEL_OUTOF10: 6

## 2024-05-14 ASSESSMENT — PAIN DESCRIPTION - ORIENTATION
ORIENTATION: RIGHT

## 2024-05-14 ASSESSMENT — PAIN DESCRIPTION - LOCATION
LOCATION: LEG

## 2024-05-14 ASSESSMENT — PAIN DESCRIPTION - PAIN TYPE: TYPE: ACUTE PAIN

## 2024-05-14 NOTE — DISCHARGE INSTRUCTIONS
Establish and follow-up with primary care physician Dr. Rader in 1 to 2 days for reevaluation.  Call for an appointment  To have sutures removed in 3 to 5 days from the chin  Take Robaxin muscle relaxant as prescribed and directed.  No drinking or driving while taking  Take naproxen as prescribed for inflammation pain and swelling  Take Tylenol as needed for pain  Keep the wound clean and dry  Return to the emergency department immediately pain fever chills nausea vomiting dizzy lightheadedness or any worsening symptoms.
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
